# Patient Record
Sex: FEMALE | Race: OTHER | Employment: FULL TIME | URBAN - METROPOLITAN AREA
[De-identification: names, ages, dates, MRNs, and addresses within clinical notes are randomized per-mention and may not be internally consistent; named-entity substitution may affect disease eponyms.]

---

## 2023-12-17 ENCOUNTER — HOSPITAL ENCOUNTER (OUTPATIENT)
Age: 43
Discharge: HOME | End: 2023-12-17
Payer: OTHER MISCELLANEOUS

## 2023-12-17 VITALS
DIASTOLIC BLOOD PRESSURE: 80 MMHG | SYSTOLIC BLOOD PRESSURE: 140 MMHG | OXYGEN SATURATION: 100 % | HEART RATE: 75 BPM | TEMPERATURE: 98 F | RESPIRATION RATE: 19 BRPM

## 2023-12-17 DIAGNOSIS — S39.012A BACK STRAIN, INITIAL ENCOUNTER: Primary | ICD-10-CM

## 2023-12-17 PROCEDURE — 96372 THER/PROPH/DIAG INJ SC/IM: CPT

## 2023-12-17 PROCEDURE — 99204 OFFICE O/P NEW MOD 45 MIN: CPT

## 2023-12-17 PROCEDURE — 99203 OFFICE O/P NEW LOW 30 MIN: CPT

## 2023-12-17 RX ORDER — KETOROLAC TROMETHAMINE 30 MG/ML
60 INJECTION, SOLUTION INTRAMUSCULAR; INTRAVENOUS ONCE
Status: COMPLETED | OUTPATIENT
Start: 2023-12-17 | End: 2023-12-17

## 2023-12-17 RX ORDER — LIDOCAINE 50 MG/G
1 PATCH TOPICAL EVERY 24 HOURS
Qty: 10 PATCH | Refills: 0 | Status: SHIPPED | OUTPATIENT
Start: 2023-12-17 | End: 2023-12-27

## 2023-12-17 NOTE — ED INITIAL ASSESSMENT (HPI)
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 She went to reach over the table to catch the dog and she developed back pain after reaching motion  Patient went to UC yesterday and was prescribed muscle relaxer. She tried the medication last night and this morning and was advised by other UC not to take any nsaids. Here still with pain on movement.

## 2023-12-21 ENCOUNTER — APPOINTMENT (OUTPATIENT)
Dept: OTHER | Facility: HOSPITAL | Age: 43
End: 2023-12-21
Attending: EMERGENCY MEDICINE

## 2024-02-13 ENCOUNTER — OFFICE VISIT (OUTPATIENT)
Dept: OTHER | Facility: HOSPITAL | Age: 44
End: 2024-02-13
Attending: NURSE PRACTITIONER

## 2024-02-13 DIAGNOSIS — M54.42 LEFT-SIDED LOW BACK PAIN WITH LEFT-SIDED SCIATICA: Primary | ICD-10-CM

## 2024-02-13 RX ORDER — PREDNISONE 20 MG/1
20 TABLET ORAL 2 TIMES DAILY
Qty: 10 TABLET | Refills: 0 | Status: SHIPPED | OUTPATIENT
Start: 2024-02-13 | End: 2024-02-18

## 2024-02-20 ENCOUNTER — OFFICE VISIT (OUTPATIENT)
Dept: OTHER | Facility: HOSPITAL | Age: 44
End: 2024-02-20
Attending: NURSE PRACTITIONER

## 2024-02-20 DIAGNOSIS — M54.16 LEFT LUMBAR RADICULOPATHY: ICD-10-CM

## 2024-02-20 DIAGNOSIS — M53.3 SACRAL BACK PAIN: Primary | ICD-10-CM

## 2024-02-20 RX ORDER — NAPROXEN 500 MG/1
500 TABLET ORAL 2 TIMES DAILY PRN
Qty: 14 TABLET | Refills: 0 | Status: SHIPPED | OUTPATIENT
Start: 2024-02-20 | End: 2024-02-27

## 2024-02-27 ENCOUNTER — HOSPITAL ENCOUNTER (OUTPATIENT)
Dept: MRI IMAGING | Facility: HOSPITAL | Age: 44
Discharge: HOME | End: 2024-02-27
Attending: NURSE PRACTITIONER
Payer: OTHER MISCELLANEOUS

## 2024-02-27 DIAGNOSIS — M54.16 LEFT LUMBAR RADICULOPATHY: ICD-10-CM

## 2024-02-27 DIAGNOSIS — M53.3 SACRAL BACK PAIN: ICD-10-CM

## 2024-02-27 PROCEDURE — 72148 MRI LUMBAR SPINE W/O DYE: CPT | Performed by: NURSE PRACTITIONER

## 2024-02-27 PROCEDURE — 72195 MRI PELVIS W/O DYE: CPT | Performed by: NURSE PRACTITIONER

## 2024-02-29 ENCOUNTER — APPOINTMENT (OUTPATIENT)
Dept: OTHER | Facility: HOSPITAL | Age: 44
End: 2024-02-29
Attending: EMERGENCY MEDICINE

## 2024-03-01 ENCOUNTER — MED REC SCAN ONLY (OUTPATIENT)
Dept: PHYSICAL MEDICINE AND REHAB | Facility: CLINIC | Age: 44
End: 2024-03-01

## 2024-03-07 ENCOUNTER — OFFICE VISIT (OUTPATIENT)
Dept: PHYSICAL MEDICINE AND REHAB | Facility: CLINIC | Age: 44
End: 2024-03-07
Payer: COMMERCIAL

## 2024-03-07 VITALS — WEIGHT: 190 LBS

## 2024-03-07 DIAGNOSIS — M54.16 LEFT LUMBAR RADICULITIS: Primary | ICD-10-CM

## 2024-03-07 PROCEDURE — 99204 OFFICE O/P NEW MOD 45 MIN: CPT | Performed by: PHYSICAL MEDICINE & REHABILITATION

## 2024-03-07 RX ORDER — GABAPENTIN 100 MG/1
CAPSULE ORAL
Qty: 120 CAPSULE | Refills: 0 | Status: SHIPPED | OUTPATIENT
Start: 2024-03-07

## 2024-03-07 RX ORDER — CYCLOBENZAPRINE HCL 10 MG
TABLET ORAL NIGHTLY PRN
COMMUNITY
Start: 2024-02-29

## 2024-03-07 NOTE — H&P
History and Physical    C/C:   Chief Complaint   Patient presents with    New Patient     New R handed patient is here for b/l low back and L sciatic pain. She is a  and she lunges across the table on 12/15/23. MRI 02/27/24. No PT or injections. Denies t/n. Low back pain radiates to L buttock, L posterior thigh, L lateral knee and L lateral calf. Taking naproxen BID and flexeril nightly. Pain 4/10. Pain is worse in the morning and when getting up from seated position.      HPI: 43 year old female presents with a work related incident. Works as a ; while at work on 12/15/23 attempted to prevent a dog from falling off the table, developed acute low back and leg pain. Went to the  2 days later, was given a toradol injection with some improvement.     Since December she has had left buttock pain radiating down the leg to the left lateral ankle; leg greater than back pain. No n/t in the back or leg. Worsen with sitting, transitioning from sit-stand. Mild to minimal pain with standing and walking, unless the leg is already flared up.     Seen by occupational health, was given a course of corticosteroids without improvement.     Had an MRI of the lumbar spine, sacrum, and was referred to this clinic. No physical therapy. No history of lumbosacral spine injections or surgeries. She continues to work light duty restrictions - no lifting, pushing, pulling greater than 20 pounds.     Allergies: No Known Allergies      Physical exam:  Wt 190 lb (86.2 kg)   LMP 12/14/2023 (Exact Date)     Lumbar spine exam:    No skin rash lumbar/upper sacral region  + pain with lumbar flexion. + pain with lumbar extension. Flexion more painful than extension.  5/5 LE strength b/l in HF, KE, ADF, EHL, ankle eversion, ankle inversion, toe flexion, PF  SILT b/l LE  2/4 quad, gastrocs reflexes b/l  Facet loading negative  Seated slump test + for left buttock and leg pain    Imaging: MRI lumbar spine dated 2/27/24  independently reviewed, as was report. There is a quite small protrusion at L5-S1; left paracentral protrusion at L3-4    Assessment and plan:  Lumbar disc herniation with radiculopathy    From an anatomic standpoint the radiographic abnormalities are quite mild, and her strength is well preserved; she is more likely than not to do well with conservative treatment. She will start physical therapy for neutral to extension lumbar stabilization, and gabapentin 200mg at bedtime x1 week, then 200mg BID if no side effects and no benefit with night time dosing only. Follow up in 4 weeks.     In the meantime she will continue light duty restrictions - no lifting, pushing, pulling greater than 20 pounds, avoid repetitive bending/stooping.     Leonardo Lehman DO  Physical Medicine and Rehabilitation  [facility]

## 2024-03-14 ENCOUNTER — TELEPHONE (OUTPATIENT)
Dept: PHYSICAL MEDICINE AND REHAB | Facility: CLINIC | Age: 44
End: 2024-03-14

## 2024-03-14 NOTE — TELEPHONE ENCOUNTER
W/C  office notes, work note and PT order from March 7th faxed to adjustor:    Janie Nicolas  Fax: [phone]   [facility]

## 2024-04-02 ENCOUNTER — MED REC SCAN ONLY (OUTPATIENT)
Dept: PHYSICAL MEDICINE AND REHAB | Facility: CLINIC | Age: 44
End: 2024-04-02

## 2024-04-04 ENCOUNTER — TELEPHONE (OUTPATIENT)
Dept: PHYSICAL MEDICINE AND REHAB | Facility: CLINIC | Age: 44
End: 2024-04-04

## 2024-04-04 ENCOUNTER — OFFICE VISIT (OUTPATIENT)
Dept: PHYSICAL MEDICINE AND REHAB | Facility: CLINIC | Age: 44
End: 2024-04-04

## 2024-04-04 VITALS — HEART RATE: 98 BPM | WEIGHT: 190 LBS | OXYGEN SATURATION: 100 %

## 2024-04-04 DIAGNOSIS — M54.16 LEFT LUMBAR RADICULITIS: Primary | ICD-10-CM

## 2024-04-04 PROCEDURE — 99214 OFFICE O/P EST MOD 30 MIN: CPT | Performed by: PHYSICAL MEDICINE & REHABILITATION

## 2024-04-04 RX ORDER — MELOXICAM 15 MG/1
TABLET ORAL
Qty: 14 TABLET | Refills: 0 | Status: SHIPPED | OUTPATIENT
Start: 2024-04-04

## 2024-04-04 NOTE — PATIENT INSTRUCTIONS
Recommend you either use a towel roll or pillow, or you could purchase a lumbar roll to better tolerate the seated position.     Take meloxicam daily for the next 7 days.     We will call you to schedule epidural steroid injection under IVCS.

## 2024-04-04 NOTE — TELEPHONE ENCOUNTER
Faxed OV notes, work notes, and injection referral to Janie De Dios w/c  with [facility]. Fax # [phone].

## 2024-04-04 NOTE — PROGRESS NOTES
Progress note    C/C:   Chief Complaint   Patient presents with    Follow - Up     LOV 3/7/24 pt is here for a follow up after physical therapy. No N/T. Takes ibupfrofen and gabapentin. Pain depends on the activity she's doing, pain is worse in the morning and when sitting for long periods of time. States she getting muscle spasms if she turns the wrong way. Completed her PT yesterday and has gotten approved to do another 8 sessions.       HPI: 43-year-old female presents for follow-up.  Since last time I saw her she has been participating in physical therapy without much improvement in left buttock and leg pain rating down to the calf.  She remains limited in sitting, forward bending.  Once moving symptoms seem to ease.  Has been taking gabapentin at nighttime only.    Pertinent allergies: No Known Allergies     Physical exam:  Pulse 98   Wt 190 lb (86.2 kg)   LMP 12/14/2023 (Exact Date)   SpO2 100%      Lumbar spine exam:    + pain with lumbar flexion. No pain with lumbar extension.  5/5 LE strength b/l  2/4 quad, gastrocs reflexes b/l  Seated slump test + left leg pain    Imaging: No new imaging to review    Assessment and plan  Left lumbar radiculitis    Persistent symptoms since January.  Recommend she continue with physical therapy, current dose of gabapentin.  Start meloxicam 15 mg daily for 1 week, then 2 daily on an as-needed basis.  I also recommend that we do a left S1 transforaminal epidural steroid injection under fluoroscopy, IVCS for situational anxiety. We discussed the risks of procedure, including bleeding, infection, nerve injury, HA; elects to proceed.    Continue light duty restrictions. She should follow up in the office 2 weeks after injection for post-procedure evaluation.     Leonardo Lehman DO  Physical Medicine and Rehabilitation  [facility]

## 2024-04-24 RX ORDER — DIAZEPAM 5 MG/1
TABLET ORAL
Qty: 2 TABLET | Refills: 0 | OUTPATIENT
Start: 2024-04-24

## 2024-04-24 NOTE — TELEPHONE ENCOUNTER
Best for her not to take valium or any other anxiolytics if she is going to have the procedure done under IV conscious sedation. The conscious sedation includes an anxiolytic.

## 2024-04-24 NOTE — TELEPHONE ENCOUNTER
Spoke with patient who stated she is very nervous about upcoming injection as she has a fear of needles. Patient is already scheduled to have conscious sedation for upcoming injection on 4/29/24.     Patient wanting to know if  would be able to prescribe Valium or something for her to take prior to arriving for the injection to help calm her nerves. Patient states she has taken Valium before in the past.     Valium rx pended for  review/approval, if appropriate.    Informed patient message will be sent to  for recommendations. Patient was very appreciative.

## 2024-04-25 NOTE — TELEPHONE ENCOUNTER
Called and left a voice mail. Patient active on [employer]. My Chart message also sent with Dr Lehman's response.

## 2024-04-29 PROBLEM — M54.16 LEFT LUMBAR RADICULITIS: Status: ACTIVE | Noted: 2024-04-29

## 2024-05-08 ENCOUNTER — MED REC SCAN ONLY (OUTPATIENT)
Dept: PHYSICAL MEDICINE AND REHAB | Facility: CLINIC | Age: 44
End: 2024-05-08

## 2024-05-14 ENCOUNTER — OFFICE VISIT (OUTPATIENT)
Dept: PHYSICAL MEDICINE AND REHAB | Facility: CLINIC | Age: 44
End: 2024-05-14

## 2024-05-14 VITALS — HEIGHT: 64 IN | BODY MASS INDEX: 33.29 KG/M2 | WEIGHT: 195 LBS

## 2024-05-14 DIAGNOSIS — M54.16 LEFT LUMBAR RADICULITIS: Primary | ICD-10-CM

## 2024-05-14 PROCEDURE — 99213 OFFICE O/P EST LOW 20 MIN: CPT | Performed by: PHYSICAL MEDICINE & REHABILITATION

## 2024-05-14 RX ORDER — GABAPENTIN 300 MG/1
CAPSULE ORAL
Qty: 60 CAPSULE | Refills: 0 | Status: SHIPPED | OUTPATIENT
Start: 2024-05-14

## 2024-05-14 NOTE — PROGRESS NOTES
Progress note    C/C:   Chief Complaint   Patient presents with    Low Back Pain     LOV 04/04/2024 Patient presents today with L-side sciatica pain.Patient states her pain level today 4/10 now and it worse in the morning. Patient is taking Ibuprofen / Tylenol and she doesn't feel like its helpful.       HPI: 43-year-old female presents for follow-up.  Underwent left S1 transforaminal epidural steroid injection under fluoroscopy. Not much improvement. Started physical therapy. Still limited in sitting, forward bending.  Continues to have left buttock and leg pain radiating down to the calf.    Pertinent allergies: No Known Allergies     Physical exam:  Ht 64\"   Wt 195 lb (88.5 kg)   LMP 03/19/2024 (Exact Date)   BMI 33.47 kg/m²      Lumbar spine exam:     + pain with lumbar flexion. No pain with lumbar extension.  5/5 LE strength b/l  2/4 quad, gastrocs reflexes b/l  Seated slump test + left leg pain    Imaging: No imaging to review    Assessment and plan  Left lumbar radiculitis    Continue physical therapy, gabapentin we will have her resume gabapentin at a higher dose.  She is to take 300 mg nightly for 1 week, then 300 mg twice daily if no side effects and no benefit.  May need to consider repeating left S1 transforaminal epidural steroid injection under fluoroscopy.  Continue light duty restrictions -no lifting, pushing, pulling greater than 20 pounds.      Follow-up after completion of PT.    Leonardo Lehman DO  Physical Medicine and Rehabilitation  [facility]

## 2024-06-06 ENCOUNTER — MED REC SCAN ONLY (OUTPATIENT)
Dept: PHYSICAL MEDICINE AND REHAB | Facility: CLINIC | Age: 44
End: 2024-06-06

## 2024-07-09 ENCOUNTER — TELEPHONE (OUTPATIENT)
Dept: PHYSICAL MEDICINE AND REHAB | Facility: CLINIC | Age: 44
End: 2024-07-09

## 2024-07-09 ENCOUNTER — OFFICE VISIT (OUTPATIENT)
Dept: PHYSICAL MEDICINE AND REHAB | Facility: CLINIC | Age: 44
End: 2024-07-09
Payer: OTHER MISCELLANEOUS

## 2024-07-09 VITALS — WEIGHT: 205 LBS | HEART RATE: 80 BPM | BODY MASS INDEX: 35 KG/M2 | OXYGEN SATURATION: 98 %

## 2024-07-09 DIAGNOSIS — M54.16 LEFT LUMBAR RADICULITIS: Primary | ICD-10-CM

## 2024-07-09 PROCEDURE — 99213 OFFICE O/P EST LOW 20 MIN: CPT | Performed by: PHYSICAL MEDICINE & REHABILITATION

## 2024-07-09 RX ORDER — NORTRIPTYLINE HYDROCHLORIDE 10 MG/1
CAPSULE ORAL
Qty: 60 CAPSULE | Refills: 0 | Status: SHIPPED | OUTPATIENT
Start: 2024-07-09

## 2024-07-09 NOTE — TELEPHONE ENCOUNTER
Workers Comp Claim #[id number] Medical  Janie Fidencio  DOI 12/15/23    Left Message with Janie at [employer] to initiate authorization for Physical Therapy     Orders/clinicals faxed to [phone]  Status: pending determination

## 2024-07-09 NOTE — PROGRESS NOTES
Progress note    C/C:   Chief Complaint   Patient presents with    Follow - Up     05/14/24 LOV. She has 2 PT sessions left. States 50% improvement so far. She is doing HEP. Denies t/n. Pain 3/10. 300 mg gabapentin nightly. Aleve/ibuprofen daily and nightly.       HPI: 43-year-old female presents for follow-up.  Since last last time I saw her she has continued physical therapy. Slow improvement but has had definitive improvement. Does not think she can get by without medication yet. Ran out o fiburpofen, not able to sleep. Less stabbing pain into the buttock and down the leg without rhyme or reason; that is more rare than before. The frequency of leg pain is still the same, but less intense. Varies with activity; sitting the most painful. Taking gabapentin 300mg most nights. No n/t in the leg or the foot.     Pertinent allergies: No Known Allergies     Physical exam:  Pulse 80   Wt 205 lb (93 kg)   LMP 03/19/2024 (Exact Date)   SpO2 98%   BMI 35.19 kg/m²      Lumbar spine exam:    No skin rash lumbar/upper sacral region  + pain with lumbar flexion. No pain with lumbar extension.  5/5 LE strength b/l  SILT b/l LE  2/4 quad, gastrocs reflexes b/l  Seated slump test + for left buttock and leg pain    Imaging: No new imaging to review     Assessment and plan  Left lumbar radiculitis; small lumbar disc herniation    Improving slowly, but improving nonetheless.  She will continue physical therapy and should be diligent about her home exercise program.  For the radicular symptoms we will trial nortriptyline 10 mg nightly for 1 week, then 20 mg afterwards if no side effects and no benefit.  Since that she has been making slow improvements we will hold off on left S1 transforaminal epidural steroid injection and see how she does with the PT and medication adjustment. She will continue light duty work in the meantime.     F/u in 4-6 weeks.     20 minutes spent precharting, conducting H&P, discussing treatment options,  completing documentation.    Leonardo Lehman DO  Physical Medicine and Rehabilitation  [facility]

## 2024-07-10 ENCOUNTER — MED REC SCAN ONLY (OUTPATIENT)
Dept: PHYSICAL MEDICINE AND REHAB | Facility: CLINIC | Age: 44
End: 2024-07-10

## 2024-07-11 ENCOUNTER — TELEPHONE (OUTPATIENT)
Dept: PHYSICAL MEDICINE AND REHAB | Facility: CLINIC | Age: 44
End: 2024-07-11

## 2024-07-11 NOTE — TELEPHONE ENCOUNTER
Workmans comp notes from 7/9/24- work note and PT order sent to:    [facility]  Fax # [phone]

## 2024-07-19 NOTE — TELEPHONE ENCOUNTER
Another detailed message has been left for Janie to advise on status.  Call back number as well as fax was provided to contact me or send determination via fax.  Reviewed and electronically signed by: GIDEON Valdivia

## 2024-08-09 ENCOUNTER — MED REC SCAN ONLY (OUTPATIENT)
Dept: PHYSICAL MEDICINE AND REHAB | Facility: CLINIC | Age: 44
End: 2024-08-09

## 2024-09-04 ENCOUNTER — MED REC SCAN ONLY (OUTPATIENT)
Dept: PHYSICAL MEDICINE AND REHAB | Facility: CLINIC | Age: 44
End: 2024-09-04

## 2024-09-05 ENCOUNTER — OFFICE VISIT (OUTPATIENT)
Dept: PHYSICAL MEDICINE AND REHAB | Facility: CLINIC | Age: 44
End: 2024-09-05
Payer: OTHER MISCELLANEOUS

## 2024-09-05 VITALS — WEIGHT: 205 LBS | BODY MASS INDEX: 35 KG/M2

## 2024-09-05 DIAGNOSIS — M54.16 LEFT LUMBAR RADICULITIS: Primary | ICD-10-CM

## 2024-09-05 PROCEDURE — 99214 OFFICE O/P EST MOD 30 MIN: CPT | Performed by: PHYSICAL MEDICINE & REHABILITATION

## 2024-09-05 RX ORDER — NORTRIPTYLINE HCL 10 MG
10 CAPSULE ORAL EVERY EVENING
Qty: 30 CAPSULE | Refills: 2 | Status: SHIPPED | OUTPATIENT
Start: 2024-09-05

## 2024-09-05 NOTE — PROGRESS NOTES
Progress note    C/C:   Chief Complaint   Patient presents with    Follow - Up     Lov 7/9/24 Lower back pain radiates down left leg . Pain 2/10 siting worsen the pain . No T/N. Aleve/Nortriptyline daily with relief. Continues PT 50% improvement.        HPI: 43-year-old female presents for follow-up.  Has seen some improvement with physical therapy. Feels relatively comfortable with the amount she is lifting at work. Does still feel tightness and pain into the thigh and calf, but no longer in the foot. Sitting still an issue, but less painful than it was before. Takes nortriptyline, varies the dose. 10-20mg at night time, more often 10mg only. While kneeling can be uncomfortable. Can sit for about 30 minutes before looking to change position.  Physical therapy is recommending 4 more sessions.    Pertinent allergies: No Known Allergies     Physical exam:  Wt 205 lb (93 kg)   LMP 03/19/2024 (Exact Date)   BMI 35.19 kg/m²      Lumbar spine exam:    No skin rash lumbar/upper sacral region  No pain with lumbar flexion. No pain with lumbar extension.  5/5 LE strength b/l  SILT b/l LE  2/4 quad, gastrocs reflexes b/l  Seated slump test + for left buttock and leg pain    Imaging: No new imaging to review    Assessment and plan  Lumbar disc herniation with radiculopathy    Still some nerve root irritability, though has continued to make slow but steady progress.  I signed off on 4 more sessions of physical therapy.  After this we should consider a graduated return to work program, in which her lifting restrictions are being upgraded to 40 pounds for 2 weeks and then increase by 10 pounds every 2 weeks until she gets to 80 pounds to meet the demands of her job.  If she is unable to lift 30-40 pounds under the guidance of her physical therapist without provoking left buttock or leg pain then she will need 2 weeks of work conditioning.  She is to let me know how she is doing with the bending and lifting lifting once she has  completed her last 4 remaining sessions of physical therapy and we will decide how to proceed from there.  In the meantime she may continue taking nortriptyline 10 mg nightly.    30 minutes spent precharting, conducting H&P, discussing treatment options, completing documentation.    Leonardo Lehman DO  Physical Medicine and Rehabilitation  [facility]

## 2024-09-10 ENCOUNTER — TELEPHONE (OUTPATIENT)
Dept: PHYSICAL MEDICINE AND REHAB | Facility: CLINIC | Age: 44
End: 2024-09-10

## 2024-09-10 NOTE — TELEPHONE ENCOUNTER
Office visit notes for Workmans comp from Sept 5th to [facility] @ Janie Nicolas   [phone]  and faxed to [phone] this fax # was given to STEVE fulton.

## 2024-09-30 ENCOUNTER — HOSPITAL ENCOUNTER (OUTPATIENT)
Age: 44
Discharge: HOME | End: 2024-09-30
Payer: COMMERCIAL

## 2024-09-30 VITALS
TEMPERATURE: 98 F | HEART RATE: 92 BPM | RESPIRATION RATE: 18 BRPM | OXYGEN SATURATION: 99 % | DIASTOLIC BLOOD PRESSURE: 91 MMHG | SYSTOLIC BLOOD PRESSURE: 152 MMHG

## 2024-09-30 DIAGNOSIS — H66.91 RIGHT OTITIS MEDIA, UNSPECIFIED OTITIS MEDIA TYPE: Primary | ICD-10-CM

## 2024-09-30 PROCEDURE — 99213 OFFICE O/P EST LOW 20 MIN: CPT

## 2024-09-30 RX ORDER — FLUTICASONE PROPIONATE 50 MCG
2 SPRAY, SUSPENSION (ML) NASAL 2 TIMES DAILY PRN
Qty: 16 G | Refills: 0 | Status: SHIPPED | OUTPATIENT
Start: 2024-09-30 | End: 2024-10-10

## 2024-09-30 NOTE — ED PROVIDER NOTES
Chief Complaint   Patient presents with    Ear Problem Pain       HPI:     Giles Figueroa is a 43 year old female who presents for evaluation of nasal congestion over the last week, denies associated fever, taking over-the-counter decongestion with mild improvement, notes developing right ear pain over the last few days most notably upon travel back from a  last yesterday.  Patient states was visualized by a colleague at work today with concerns of perforation.  Denies history of otitis media or recent antibiotic or previous perforation or tubes.  Denies associated headache dizziness sore throat dysphagia sinus pressure neck pain chest pain shortness of breath vomiting or diarrhea      PFSH    PFSH asessment screens reviewed and agree.  Nurses notes reviewed I agree with documentation.    No family history on file.  Family history reviewed with patient/caregiver and is not pertinent to presenting problem.  Social History     Socioeconomic History    Marital status: Single     Spouse name: Not on file    Number of children: Not on file    Years of education: Not on file    Highest education level: Not on file   Occupational History    Not on file   Tobacco Use    Smoking status: Every Day     Types: Cigarettes     Passive exposure: Current    Smokeless tobacco: Current   Substance and Sexual Activity    Alcohol use: Yes     Alcohol/week: 8.0 standard drinks of alcohol     Types: 4 Glasses of wine, 4 Standard drinks or equivalent per week    Drug use: Yes     Frequency: 3.0 times per week     Types: Cannabis    Sexual activity: Not on file   Other Topics Concern    Caffeine Concern Yes    Exercise Yes    Seat Belt Not Asked    Special Diet Not Asked    Stress Concern Not Asked    Weight Concern Not Asked   Social History Narrative    Not on file     Social Determinants of Health     Financial Resource Strain: Not on file   Food Insecurity: Not on file   Transportation Needs: Not on file   Physical  Activity: Not on file   Stress: Not on file   Social Connections: Not on file   Housing Stability: Not on file         ROS:   Positive for stated complaint: Ear pain congestion.  All other systems reviewed and negative except as noted above.  Constitutional and Vital Signs Reviewed.      Physical Exam:     Findings:    BP (!) 152/91   Pulse 92   Temp 98 °F (36.7 °C) (Temporal)   Resp 18   LMP 2024 (Exact Date)   SpO2 99%   GENERAL: well developed, well nourished, well hydrated, no distress  SKIN: good skin turgor, no obvious rashes  NECK: supple, no adenopathy  EXTREMITIES: no cyanosis or edema. MACKENZIE without difficulty  GI: soft, non-tender, normal bowel sounds  HEAD: normocephalic, atraumatic  EYES: sclera non icteric bilateral, conjunctiva clear  EARS: Moderate erythema along the right ear canal extending to the TM with slight bulge without visualized perforation.  NOSE: Mild rhinorrhea.  No maxillary ethmoid or frontal sinus tenderness.  Nasal turbinates: pink, normal mucosa  THROAT: clear, without exudates, uvula midline, and airway patent  LUNGS: No retractions.  Clear to auscultation bilaterally; no rales, rhonchi, or wheezes  NEURO: No focal deficits  PSYCH: Alert and oriented x3.  Answering questions appropriately.  Mood appropriate.    MDM/Assessment/Plan:   Orders for this encounter:    Orders Placed This Encounter    amoxicillin clavulanate 875-125 MG Oral Tab     Sig: Take 1 tablet by mouth 2 (two) times daily for 10 days.     Dispense:  20 tablet     Refill:  0    fluticasone propionate 50 MCG/ACT Nasal Suspension     Si sprays by Nasal route 2 (two) times daily as needed for Rhinitis.     Dispense:  16 g     Refill:  0       Labs performed this visit:  No results found for this or any previous visit (from the past 10 hour(s)).    MDM:  Patient instructed without full confidence and perforation it agrees to start empiric antibiotics for bacterial otitis media with nasal spray for  supportive congestion and to readdress with ENT for further confidence and take precautions over the days ahead if a microperforation in place.  Happy with plan of care    Diagnosis:    ICD-10-CM    1. Right otitis media, unspecified otitis media type  H66.91           All results reviewed and discussed with patient.  See AVS for detailed discharge instructions for your condition today.    Follow Up with:  Abran Garzon MD  [address] [place] [address]  [phone]    Schedule an appointment as soon as possible for a visit   ent follow up

## 2024-09-30 NOTE — ED INITIAL ASSESSMENT (HPI)
Patient believes she may have a ruptured right tympanic membrane.  Reports recent congestion, + airplane travel last night.  Denies feeling a pop or discharge from right ear.

## 2024-10-07 ENCOUNTER — OFFICE VISIT (OUTPATIENT)
Dept: OTOLARYNGOLOGY | Facility: CLINIC | Age: 44
End: 2024-10-07
Payer: COMMERCIAL

## 2024-10-07 DIAGNOSIS — H73.91 TYMPANIC MEMBRANE DISORDER, RIGHT: Primary | ICD-10-CM

## 2024-10-07 NOTE — PROGRESS NOTES
Giles Figueroa is a 43 year old female.   Chief Complaint   Patient presents with    Ear Problem     Perforation of right tympanic membrane  Pt is currently antibiotics     HPI:   43-year-old presents for right ear evaluation.  Flu back from [place] with a cold.  Was on oral antibiotics.  For the most part feels back to baseline with regards to her ear    Current Outpatient Medications   Medication Sig Dispense Refill    amoxicillin clavulanate 875-125 MG Oral Tab Take 1 tablet by mouth 2 (two) times daily for 10 days. 20 tablet 0    fluticasone propionate 50 MCG/ACT Nasal Suspension 2 sprays by Nasal route 2 (two) times daily as needed for Rhinitis. 16 g 0    nortriptyline 10 MG Oral Cap Take 1 capsule (10 mg total) by mouth every evening. 30 capsule 2      History reviewed. No pertinent past medical history.   Social History:  Social History     Socioeconomic History    Marital status: Single   Tobacco Use    Smoking status: Every Day     Types: Cigarettes     Passive exposure: Current    Smokeless tobacco: Current   Substance and Sexual Activity    Alcohol use: Yes     Alcohol/week: 8.0 standard drinks of alcohol     Types: 4 Glasses of wine, 4 Standard drinks or equivalent per week    Drug use: Yes     Frequency: 3.0 times per week     Types: Cannabis   Other Topics Concern    Caffeine Concern Yes    Exercise Yes      History reviewed. No pertinent surgical history.      EXAM:   LMP 03/19/2024 (Exact Date)     System Details   Skin Inspection - Normal.   Constitutional Overall appearance - Normal.   Head/Face Symmetric, TMJ tenderness not present    Eyes EOMI, PERRL   Right ear:  Canal clear, TM with 2 small intratympanic hemorrhages, no LANG   Left ear:  Canal clear, TM intact, no LANG   Nose: Septum midline, inferior turbinates not enlarged, nasal valves without collapse    Oral cavity/Oropharynx: No lesions or masses on inspection or palpation, tonsils symmetric    Neck: Soft without LAD, thyroid not  enlarged  Voice clear/ no stridor   Other:      SCOPES AND PROCEDURES:         AUDIOGRAM AND IMAGING:         IMPRESSION:   1. Tympanic membrane disorder, right       Recommendations:  -2 small intratympanic hemorrhages on the right which should resolve with time.  Otherwise no concerns noted including fluid or otitis or retraction or a perforation.  Discussed eustachian tube measures while flying for the future    The patient indicates understanding of these issues and agrees to the plan.  Consult from Dr Red regarding ear evaluation    Abran Garzon MD  10/7/2024  4:31 PM

## 2024-10-10 ENCOUNTER — MED REC SCAN ONLY (OUTPATIENT)
Dept: PHYSICAL MEDICINE AND REHAB | Facility: CLINIC | Age: 44
End: 2024-10-10

## 2024-10-28 ENCOUNTER — PATIENT MESSAGE (OUTPATIENT)
Dept: PHYSICAL MEDICINE AND REHAB | Facility: CLINIC | Age: 44
End: 2024-10-28

## 2024-10-31 NOTE — TELEPHONE ENCOUNTER
LOV: 9/05/2024 w/ Dr. Fallon CARR PLAN:  \"Still some nerve root irritability, though has continued to make slow but steady progress.  I signed off on 4 more sessions of physical therapy.  After this we should consider a graduated return to work program, in which her lifting restrictions are being upgraded to 40 pounds for 2 weeks and then increase by 10 pounds every 2 weeks until she gets to 80 pounds to meet the demands of her job.  If she is unable to lift 30-40 pounds under the guidance of her physical therapist without provoking left buttock or leg pain then she will need 2 weeks of work conditioning.  She is to let me know how she is doing with the bending and lifting lifting once she has completed her last 4 remaining sessions of physical therapy and we will decide how to proceed from there.  In the meantime she may continue taking nortriptyline 10 mg nightly. \"

## 2024-11-26 ENCOUNTER — OFFICE VISIT (OUTPATIENT)
Dept: PHYSICAL MEDICINE AND REHAB | Facility: CLINIC | Age: 44
End: 2024-11-26
Payer: OTHER MISCELLANEOUS

## 2024-11-26 DIAGNOSIS — M54.16 LEFT LUMBAR RADICULITIS: Primary | ICD-10-CM

## 2024-11-26 PROCEDURE — 99213 OFFICE O/P EST LOW 20 MIN: CPT | Performed by: PHYSICAL MEDICINE & REHABILITATION

## 2024-11-26 NOTE — PROGRESS NOTES
Progress note    C/C:   Chief Complaint   Patient presents with    Follow - Up     LOV 9/5/24. Patient presents for f/u on low back. Pain 3/10. Denies N/T. Denies weakness. Patient is in PT with relief. Takes Aleve for pain with relief.       HPI: 44 year old female presents for follow up. Continues to have left buttock, posterior thigh and calf pain. Still having increased pain with forward bending tasks, though not as painful as it was previously. Not able to pursue physical therapy as she had other medical issues that came up, and also needed to help mother recover from shoulder surgery. 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    At most rates pain 6/10.     Pertinent allergies: Allergies[1]     Physical exam:  LMP 03/19/2024 (Exact Date)      Lumbar spine exam:    No skin rash lumbar/upper sacral region  No pain with lumbar flexion. No pain with lumbar extension  5/5 LE strength b/l  SILT b/l LE  2/4 quad, gastrocs reflexes b/l  SLR negative b/l    Imaging: No new imaging to review    Assessment and plan  Left lumbar radiculitis    Recommend she resume physical therapy for neutral to extension lumbar stabilization. I mildly liberated her lifting restrictions; she may occasionally lift up to 40 pounds but should for the most part be lifting, pushing, pulling 20 pounds or less, and avoid repetitive forward bending where able.     Follow up after PT. From there we can decide on graduated RTW vs work conditioning.     Leonardo Lehman DO  Physical Medicine and Rehabilitation  [facility]       [1] No Known Allergies

## 2024-12-11 ENCOUNTER — TELEPHONE (OUTPATIENT)
Dept: PHYSICAL MEDICINE AND REHAB | Facility: CLINIC | Age: 44
End: 2024-12-11

## 2024-12-11 NOTE — TELEPHONE ENCOUNTER
Faxed 11/26/24 ov notes to Janie Nicolas at [facility] fax # [phone].

## 2025-02-04 ENCOUNTER — OFFICE VISIT (OUTPATIENT)
Dept: PHYSICAL MEDICINE AND REHAB | Facility: CLINIC | Age: 45
End: 2025-02-04
Payer: OTHER MISCELLANEOUS

## 2025-02-04 VITALS — WEIGHT: 205 LBS | BODY MASS INDEX: 35 KG/M2

## 2025-02-04 DIAGNOSIS — M54.16 LEFT LUMBAR RADICULITIS: Primary | ICD-10-CM

## 2025-02-04 PROCEDURE — 99213 OFFICE O/P EST LOW 20 MIN: CPT | Performed by: PHYSICAL MEDICINE & REHABILITATION

## 2025-02-04 NOTE — PROGRESS NOTES
Progress note    C/C:   Chief Complaint   Patient presents with    Follow - Up     LOV 11/26/24 pt is here for a follow up on left sided back pain. No current physical therapy. Takes aleve to ease pain. Pain 1/10      HPI: 44 year old female presents for follow up. Has had reduction in buttock and leg pain. Has been trying to stay with lifting restrictions. Radiates to the posterior thigh, and has been sometime since she had any radiation further distally into the left lower extremity. Takes aleve OTC 1-2x/day, usually once in the morning, sometimes again during/after work.     Pertinent allergies: Allergies[1]     Physical exam:  Wt 205 lb (93 kg)   LMP 03/19/2024 (Exact Date)   BMI 35.19 kg/m²      5/5 BLE strength  2/4 quad, 1/4 gastrocs reflexes  Seated slump -    Imaging: no new imaging to review    Assessment and plan  Left lumbar radiculitis, improved    We will begin to liberalize her from the work restrictions, and if doing well we will completely liberalize her from all restrictions.  We will start by increasing her lifting restrictions to no more than 60 pounds for 2 weeks, and then no more than 80 pounds for 2 weeks.  If she does well with 80 pound lifting restrictions she is to contact the clinic and I will completely liberalize her from all work restrictions, and then she should follow-up with me after 1 month to make sure she is doing well without work restrictions.    If at any point she has significant increasing buttock or leg pain we will keep the work restrictions and she should come see me for follow-up.    Leonardo Lehman DO  Physical Medicine and Rehabilitation  [facility]         [1] No Known Allergies

## 2025-03-17 ENCOUNTER — PATIENT MESSAGE (OUTPATIENT)
Dept: PHYSICAL MEDICINE AND REHAB | Facility: CLINIC | Age: 45
End: 2025-03-17

## 2025-04-29 ENCOUNTER — OFFICE VISIT (OUTPATIENT)
Dept: PHYSICAL MEDICINE AND REHAB | Facility: CLINIC | Age: 45
End: 2025-04-29
Payer: OTHER MISCELLANEOUS

## 2025-04-29 VITALS — WEIGHT: 200 LBS | BODY MASS INDEX: 34 KG/M2

## 2025-04-29 DIAGNOSIS — M54.16 LEFT LUMBAR RADICULITIS: Primary | ICD-10-CM

## 2025-04-29 PROCEDURE — 99213 OFFICE O/P EST LOW 20 MIN: CPT | Performed by: PHYSICAL MEDICINE & REHABILITATION

## 2025-04-29 NOTE — PROGRESS NOTES
The following individual(s) verbally consented to be recorded using ambient AI listening technology and understand that they can each withdraw their consent to this listening technology at any point by asking the clinician to turn off or pause the recording:    Patient name: Giles Raiiss

## 2025-04-29 NOTE — PROGRESS NOTES
Progress note    C/C:   Chief Complaint   Patient presents with    Follow - Up     Patient presents for discharge back to work. Pt denies pain, more so stiffness. Aleve as needed. CPL: 0/10.       HPI: 44-year-old female presents for follow-up.  Doing well, other than some lower back stiffness, decreased range of motion with forward bending, though not overly painful.  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  No shooting pain into the lower extremities, no associated numbness, tingling, or weakness in the legs.     Pertinent allergies: Allergies[1]     Physical exam:  Wt 200 lb (90.7 kg)   LMP 03/19/2024 (Exact Date)   BMI 34.33 kg/m²      Lumbar spine exam:    No pain with lumbar flexion. No pain with lumbar extension.  No tenderness to palpation bilateral lumbar paraspinals. No ttp bilateral PSIS.  5/5 LE strength b/l  SILT b/l LE  2/4 quad, gastrocs reflexes b/l  Seated slump test -    Imaging: no new imaging to review    Assessment and plan  Left lumbar radiculitis, resolved back/buttock pain    Doing reasonably well, other than some residual stiffness with forward bending.  She may return to work full duty without restrictions.  She is at [facility].  She may follow-up with me on an as-needed basis.    Leonardo Lehman DO  Physical Medicine and Rehabilitation  [facility]         [1] No Known Allergies

## 2025-05-09 ENCOUNTER — TELEPHONE (OUTPATIENT)
Dept: PHYSICAL MEDICINE AND REHAB | Facility: CLINIC | Age: 45
End: 2025-05-09

## (undated) NOTE — LETTER
25    Giles Figueroa  :  1980    To Whom It May Concern:    This patient was seen in our office on 25.  Work status: Recommend we begin to liberate her lifting restrictions. She may lift up to 60 pounds for the next 2 weeks, and then up to 80 pounds for the next two weeks. She is then to either let me know how she is doing and I will completely liberalize her from work restrictions, or follow up if having significant increased symptoms.     If this office may be of further assistance, please do not hesitate to contact us.      Sincerely,        Leonardo Lehman, DO

## (undated) NOTE — LETTER
24          Giles Raiiss  :  1980    To Whom It May Concern:    This patient was seen in our office on 24. She will remain on light duty restrictions - No lifting, pushing, pulling greater than 20 pounds, avoid repetitive bending/stooping. She will follow up 2 weeks after procedure.     If this office may be of further assistance, please do not hesitate to contact us.      Sincerely,        Leonardo Lehman DO

## (undated) NOTE — LETTER
Giles Figueroa  :  1980        To Whom It May Concern:     This patient was seen in our office on 2024. She will remain on light duty    restrictions - No lifting, pushing, pulling greater than 20 pounds, avoid repetitive     bending/stooping. She will follow up after Physical therapy.          Sincerely,           Leonardo Lehman DO

## (undated) NOTE — LETTER
24    Giles Raiiss  :  1980      To Whom It May Concern:    This patient was seen in our office on 24. She will remain on light duty restrictions - She should be for the most part lifting, pushing, pulling 20 pounds or less, may occasionally lift up to 40 pounds. She should continue to avoid repetitive bending/stooping. She will resume physical therapy and follow up with me afterwards.     If this office may be of further assistance, please do not hesitate to contact us.      Sincerely,        Leonardo Lehman DO

## (undated) NOTE — LETTER
25    Giles Raiiss  :  1980    To Whom It May Concern:    This patient was seen in our office on 25.  Work status: May return to work full duty without restrictions. She is at [employer].     If this office may be of further assistance, please do not hesitate to contact us.      Sincerely,        Leonardo Lehman, DO

## (undated) NOTE — LETTER
24      Giles Raiiss  :  1980      To Whom It May Concern:    This patient was seen in our office on 24. Continue current light duty restrictions - no lifting, pushing, pulling greater than 20 pounds, limited bending. She will follow up with me after completion of 4 weeks of physical therapy.     If this office may be of further assistance, please do not hesitate to contact us.      Sincerely,        Leonardo Lehman, DO

## (undated) NOTE — LETTER
Date & Time: 12/22/2023, 10:18 AM  Patient: Giles Figueroa  Encounter Provider(s):    Mayela Spivey PA-C       To Whom It May Concern:    Giles Figueroa was seen and treated in our department on 12/17/2023. She should not return to work until 12/19/2023  and needs follow up with occupational health.    If you have any questions or concerns, please do not hesitate to call.        Mayela Spivey PA-C  Physician/APC Signature

## (undated) NOTE — LETTER
24      Giles Figueroa  :  1980      To Whom It May Concern:    This patient was seen in our office on 24. Work status:  She will remain on light duty restrictions - No lifting, pushing, pulling greater than 20 pounds, avoid repetitive bending/stooping.She will continue physical therapy and follow up in 4-6 weeks. Slow to progress, but has been making progress.    If this office may be of further assistance, please do not hesitate to contact us.      Sincerely,        Leonardo Lehman, DO

## (undated) NOTE — LETTER
24      Giles Figueroa  :  1980      To Whom It May Concern:    This patient was seen in our office on 24.  Work status:  She will remain on light duty restrictions - No lifting, pushing, pulling greater than 20 pounds, avoid repetitive bending/stooping.    If this office may be of further assistance, please do not hesitate to contact us.      Sincerely,        Leonardo Lehman, DO

## (undated) NOTE — LETTER
WHERE IS YOUR PAIN NOW?  Haritha the areas on your body where you feel the described sensations.  Use the appropriate symbol.  Haritha the areas of radiation.  Include all affected areas.  Just to complete the picture, please draw in the face.     ACHE:  ^ ^ ^   NUMBNESS:  0000   PINS & NEEDLES:  = = = =                              ^ ^ ^                       0000              = = = =                                    ^ ^ ^                       0000            = = = =      BURNING:  XXXX   STABBING: ////                  XXXX                ////                         XXXX          ////     Please haritha the line below indicating your degree of pain right now  with 0 being no pain 10 being the worst pain possible.                                         0             1             2              3             4              5              6              7             8             9             10         Patient Signature:

## (undated) NOTE — LETTER
24    Giels Figueroa  :  1980    To Whom It May Concern:    This patient was seen in our office on 24.     Work status:  She will remain on light duty restrictions - No lifting, pushing, pulling greater than 20 pounds, avoid repetitive bending/stooping.She will continue physical therapy and follow up in 4-6 weeks. She will continue with 4 more sessions of physical therapy, after which we can either increase her lifting, or she starts work conditioning.     If this office may be of further assistance, please do not hesitate to contact us.      Sincerely,        Leonardo Lehman, DO